# Patient Record
Sex: MALE | Race: WHITE | NOT HISPANIC OR LATINO | Employment: FULL TIME | URBAN - METROPOLITAN AREA
[De-identification: names, ages, dates, MRNs, and addresses within clinical notes are randomized per-mention and may not be internally consistent; named-entity substitution may affect disease eponyms.]

---

## 2024-07-03 ENCOUNTER — OFFICE VISIT (OUTPATIENT)
Dept: FAMILY MEDICINE | Facility: CLINIC | Age: 49
End: 2024-07-03
Payer: COMMERCIAL

## 2024-07-03 VITALS
TEMPERATURE: 97.2 F | RESPIRATION RATE: 12 BRPM | OXYGEN SATURATION: 98 % | WEIGHT: 280.6 LBS | HEIGHT: 72 IN | HEART RATE: 60 BPM | DIASTOLIC BLOOD PRESSURE: 82 MMHG | BODY MASS INDEX: 38.01 KG/M2 | SYSTOLIC BLOOD PRESSURE: 124 MMHG

## 2024-07-03 DIAGNOSIS — I10 BENIGN ESSENTIAL HYPERTENSION: ICD-10-CM

## 2024-07-03 DIAGNOSIS — E66.812 CLASS 2 SEVERE OBESITY DUE TO EXCESS CALORIES WITH SERIOUS COMORBIDITY AND BODY MASS INDEX (BMI) OF 37.0 TO 37.9 IN ADULT (H): ICD-10-CM

## 2024-07-03 DIAGNOSIS — I21.4 NSTEMI (NON-ST ELEVATED MYOCARDIAL INFARCTION) (H): ICD-10-CM

## 2024-07-03 DIAGNOSIS — E78.2 MIXED HYPERLIPIDEMIA: ICD-10-CM

## 2024-07-03 DIAGNOSIS — Z12.11 SCREENING FOR MALIGNANT NEOPLASM OF COLON: ICD-10-CM

## 2024-07-03 DIAGNOSIS — E66.01 CLASS 2 SEVERE OBESITY DUE TO EXCESS CALORIES WITH SERIOUS COMORBIDITY AND BODY MASS INDEX (BMI) OF 37.0 TO 37.9 IN ADULT (H): ICD-10-CM

## 2024-07-03 DIAGNOSIS — I25.10 CORONARY ARTERY DISEASE INVOLVING NATIVE CORONARY ARTERY OF NATIVE HEART WITHOUT ANGINA PECTORIS: Primary | ICD-10-CM

## 2024-07-03 DIAGNOSIS — R73.03 PREDIABETES: ICD-10-CM

## 2024-07-03 PROBLEM — I48.0 PAROXYSMAL ATRIAL FIBRILLATION (H): Status: ACTIVE | Noted: 2021-10-18

## 2024-07-03 PROBLEM — Z95.1 S/P CABG X 3: Status: ACTIVE | Noted: 2021-09-27

## 2024-07-03 PROBLEM — I25.5 CARDIOMYOPATHY, ISCHEMIC: Status: ACTIVE | Noted: 2024-07-03

## 2024-07-03 PROCEDURE — 90471 IMMUNIZATION ADMIN: CPT | Performed by: FAMILY MEDICINE

## 2024-07-03 PROCEDURE — 90677 PCV20 VACCINE IM: CPT | Performed by: FAMILY MEDICINE

## 2024-07-03 PROCEDURE — 99204 OFFICE O/P NEW MOD 45 MIN: CPT | Mod: 25 | Performed by: FAMILY MEDICINE

## 2024-07-03 RX ORDER — ASPIRIN 81 MG/1
81 TABLET, CHEWABLE ORAL DAILY
COMMUNITY
Start: 2021-10-15

## 2024-07-03 RX ORDER — METOPROLOL SUCCINATE 50 MG/1
1 TABLET, EXTENDED RELEASE ORAL DAILY
COMMUNITY
Start: 2023-10-06

## 2024-07-03 RX ORDER — ROSUVASTATIN CALCIUM 40 MG/1
40 TABLET, COATED ORAL AT BEDTIME
COMMUNITY
Start: 2021-10-15 | End: 2024-08-05

## 2024-07-03 RX ORDER — UBIDECARENONE 100 MG
200 CAPSULE ORAL DAILY
COMMUNITY
Start: 2021-10-15

## 2024-07-03 RX ORDER — NITROGLYCERIN 0.4 MG/1
0.4 TABLET SUBLINGUAL EVERY 5 MIN PRN
COMMUNITY
Start: 2023-04-28

## 2024-07-03 RX ORDER — METOPROLOL SUCCINATE 25 MG/1
25 TABLET, EXTENDED RELEASE ORAL DAILY
COMMUNITY
Start: 2021-10-15 | End: 2024-07-03

## 2024-07-03 ASSESSMENT — PAIN SCALES - GENERAL: PAINLEVEL: NO PAIN (0)

## 2024-07-03 NOTE — PROGRESS NOTES
"  Assessment & Plan     Coronary artery disease involving native coronary artery of native heart without angina pectoris  Asymptomatic.  Reinforced heart healthy lifestyle.   Reviewed meds and updated list.  Reestablish care with cardiology.  - Basic metabolic panel  - Lipid panel reflex to direct LDL Fasting  - Adult Cardiology Eval  Referral    NSTEMI (non-ST elevated myocardial infarction) (H)  Asymptomatic. S/p CABG x 3  - Adult Cardiology Eval  Referral    Benign essential hypertension  Controlled.  Low salt, low fat diet.   Exercise as tolerated.  Take meds as prescribed; call if with side effects.    - Basic metabolic panel  - Adult Cardiology Eval  Referral    Mixed hyperlipidemia  Reinforced heart healthy lifestyle.   Continue statin.  - Lipid panel reflex to direct LDL Fasting  - Adult Cardiology Eval  Referral    Prediabetes  Patient said he likely is changing to Farxiga from Jardiance due to insurance. He was given this due to CHF due to ischemic cardiomyopathy.  - Basic metabolic panel  - Hemoglobin A1c    Class 2 severe obesity due to excess calories with serious comorbidity and body mass index (BMI) of 37.0 to 37.9 in adult (H)  Increases complexity of management of the above chronic conditions.  Patient was advised healthy diet recommendations.  Patient was advised weekly exercise recommendations and goals.     Screening for malignant neoplasm of colon  - Colonoscopy Screening  Referral            BMI  Estimated body mass index is 37.79 kg/m  as calculated from the following:    Height as of this encounter: 1.835 m (6' 0.25\").    Weight as of this encounter: 127.3 kg (280 lb 9.6 oz).   Weight management plan: Discussed healthy diet and exercise guidelines      There are no Patient Instructions on file for this visit.    Jessica Hogan is a 49 year old, presenting for the following health issues:  Establish Care (Previous clinic [facility], no specific " "questions or concerns today. )        7/3/2024     7:47 AM   Additional Questions   Roomed by Nallely WESTBROOK   Accompanied by wife     History of Present Illness       Reason for visit:  New primary doctor and general wellness check    He eats 4 or more servings of fruits and vegetables daily.He consumes 1 sweetened beverage(s) daily.He exercises with enough effort to increase his heart rate 30 to 60 minutes per day.  He exercises with enough effort to increase his heart rate 5 days per week.   He is taking medications regularly.         Chief Complaint   Patient presents with    [facility]     Previous clinic [facility], no specific questions or concerns today.        Hyperlipidemia Follow-Up    Are you regularly taking any medication or supplement to lower your cholesterol?   Yes- statin  Are you having muscle aches or other side effects that you think could be caused by your cholesterol lowering medication?  No    Hypertension Follow-up    Do you check your blood pressure regularly outside of the clinic? No   Are you following a low salt diet? Yes  Are your blood pressures ever more than 140 on the top number (systolic) OR more   than 90 on the bottom number (diastolic), for example 140/90? N/a    Vascular Disease Follow-up    How often do you take nitroglycerin? Never  Do you take an aspirin every day? Yes      Review of Systems  Constitutional, HEENT, cardiovascular, pulmonary, GI, , musculoskeletal, neuro, skin, endocrine and psych systems are negative, except as otherwise noted.      Objective    /82 (BP Location: Right arm, Patient Position: Chair, Cuff Size: Adult Large)   Pulse 60   Temp 97.2  F (36.2  C) (Tympanic)   Resp 12   Ht 1.835 m (6' 0.25\")   Wt 127.3 kg (280 lb 9.6 oz)   SpO2 98%   BMI 37.79 kg/m    Body mass index is 37.79 kg/m .  Physical Exam   GENERAL: obese, alert and no distress, ambulatory w/o assist  NECK: no tenderness, no adenopathy,  Thyroid not enlarged  RESP: lungs clear to " auscultation - no rales, no rhonchi, no wheezes  CV: normal rate, regular  rhythm, no murmur  MS: no edema  SKIN: no suspicious lesions, no rashes      No results found for any visits on 07/03/24.        Signed Electronically by: Rajat Yang MD

## 2024-07-22 ENCOUNTER — LAB (OUTPATIENT)
Dept: LAB | Facility: CLINIC | Age: 49
End: 2024-07-22
Payer: COMMERCIAL

## 2024-07-22 DIAGNOSIS — I25.10 CORONARY ARTERY DISEASE INVOLVING NATIVE CORONARY ARTERY OF NATIVE HEART WITHOUT ANGINA PECTORIS: ICD-10-CM

## 2024-07-22 DIAGNOSIS — I10 BENIGN ESSENTIAL HYPERTENSION: ICD-10-CM

## 2024-07-22 DIAGNOSIS — E78.2 MIXED HYPERLIPIDEMIA: ICD-10-CM

## 2024-07-22 DIAGNOSIS — R73.03 PREDIABETES: ICD-10-CM

## 2024-07-22 LAB
ANION GAP SERPL CALCULATED.3IONS-SCNC: 9 MMOL/L (ref 7–15)
BUN SERPL-MCNC: 16.2 MG/DL (ref 6–20)
CALCIUM SERPL-MCNC: 9 MG/DL (ref 8.8–10.4)
CHLORIDE SERPL-SCNC: 102 MMOL/L (ref 98–107)
CHOLEST SERPL-MCNC: 107 MG/DL
CREAT SERPL-MCNC: 0.96 MG/DL (ref 0.67–1.17)
EGFRCR SERPLBLD CKD-EPI 2021: >90 ML/MIN/1.73M2
FASTING STATUS PATIENT QL REPORTED: YES
FASTING STATUS PATIENT QL REPORTED: YES
GLUCOSE SERPL-MCNC: 99 MG/DL (ref 70–99)
HBA1C MFR BLD: 5.8 % (ref 0–5.6)
HCO3 SERPL-SCNC: 26 MMOL/L (ref 22–29)
HDLC SERPL-MCNC: 34 MG/DL
LDLC SERPL CALC-MCNC: 52 MG/DL
NONHDLC SERPL-MCNC: 73 MG/DL
POTASSIUM SERPL-SCNC: 4.1 MMOL/L (ref 3.4–5.3)
SODIUM SERPL-SCNC: 137 MMOL/L (ref 135–145)
TRIGL SERPL-MCNC: 106 MG/DL

## 2024-07-22 PROCEDURE — 36415 COLL VENOUS BLD VENIPUNCTURE: CPT

## 2024-07-22 PROCEDURE — 80048 BASIC METABOLIC PNL TOTAL CA: CPT

## 2024-07-22 PROCEDURE — 83036 HEMOGLOBIN GLYCOSYLATED A1C: CPT

## 2024-07-22 PROCEDURE — 80061 LIPID PANEL: CPT

## 2024-08-05 ENCOUNTER — MYC REFILL (OUTPATIENT)
Dept: FAMILY MEDICINE | Facility: CLINIC | Age: 49
End: 2024-08-05
Payer: COMMERCIAL

## 2024-08-05 DIAGNOSIS — E78.2 MIXED HYPERLIPIDEMIA: Primary | ICD-10-CM

## 2024-08-05 RX ORDER — ROSUVASTATIN CALCIUM 40 MG/1
40 TABLET, COATED ORAL AT BEDTIME
Qty: 90 TABLET | Refills: 1 | Status: SHIPPED | OUTPATIENT
Start: 2024-08-05

## 2024-08-05 NOTE — TELEPHONE ENCOUNTER
Pending Prescriptions:                       Disp   Refills    rosuvastatin (CRESTOR) 40 MG tablet                           Sig: Take 1 tablet (40 mg) by mouth at bedtime    Routing refill request to provider for review/approval because:  Medication is reported/historical        Cricket Wick RN

## 2024-08-11 ENCOUNTER — HEALTH MAINTENANCE LETTER (OUTPATIENT)
Age: 49
End: 2024-08-11

## 2024-09-23 ENCOUNTER — TELEPHONE (OUTPATIENT)
Dept: GASTROENTEROLOGY | Facility: CLINIC | Age: 49
End: 2024-09-23
Payer: COMMERCIAL

## 2024-09-23 NOTE — TELEPHONE ENCOUNTER
"Endoscopy Scheduling Screen      What insurance is in the chart?  Other:  [facility]    Ordering/Referring Provider: Rajat Yang MD    (If ordering provider performs procedure, schedule with ordering provider unless otherwise instructed. )    BMI: There is no height or weight on file to calculate BMI.     Sedation Ordered  general anesthesia.   BMI<= 45 45 < BMI <= 48 48 < BMI < = 50  BMI > 50   No Restrictions No [facility]  No [facility]  [facility] with exceptions Hospital Only OR Only       Do you have a history of malignant hyperthermia?  No    (Females) Are you currently pregnant?   No     Have you been diagnosed or told you have pulmonary hypertension?   No    Do you have any heart conditions?  Yes     In the past year, have you had any hospitalizations for heart related issues including cardiomyopathy, heart attack, or stent placement?  No    Do you have any implantable devices in your body (pacemaker, ICD)?  No    Do you take nitroglycerine?  No    Do you have an LVAD?  No    Have you been told you have moderate to severe sleep apnea?  No.    Have you been told you have COPD, asthma, or any other lung disease?  No    Have you ever had or are you waiting for an organ transplant?  No. Continue scheduling, no site restrictions.    Have you had a stroke or transient ischemic attack (TIA aka \"mini stroke\" in the last 6 months?   No    Have you been diagnosed with or been told you have cirrhosis of the liver?   No.    Are you currently on dialysis?   No    Do you need assistance transferring?   No    BMI: There is no height or weight on file to calculate BMI.     Is patients BMI > 50?  No    BMI > 40?  No    Do you have a diagnosis of diabetes?  No    Do you take an injectable medication for weight loss or diabetes (excluding insulin)?  No    Do you take the medication Naltrexone?  No    Do you take blood thinners?  No     Prep   Are you currently on dialysis or do you have chronic kidney " disease?  No    Do you have a diagnosis of cystic fibrosis (CF)?  No    On a regular basis do you go 3 -5 days between bowel movements?  No    Preferred Pharmacy:  [facility]    Final Scheduling Details     Procedure scheduled  Colonoscopy    Surgeon:  Miley      Date of procedure:  10/19/24     Location  [place] - Per order.    What is your communication preference for Instructions and/or Bowel Prep?   [employer]    Patient Reminders:    You will receive a call from a Nurse to review instructions and health history.  This assessment must be completed prior to your procedure.  Failure to complete the Nurse assessment may result in the procedure being cancelled.       On the day of your procedure, please designate an adult(s) who can drive you home stay with you for the next 24 hours. The medicines used in the exam will make you sleepy. You will not be able to drive.       You cannot take public transportation, ride share services, or non-medical taxi service without a responsible caregiver.  Medical transport services are allowed with the requirement that a responsible caregiver will receive you at your destination.  We require that drivers and caregivers are confirmed prior to your procedure.

## 2024-10-15 ENCOUNTER — MYC REFILL (OUTPATIENT)
Dept: FAMILY MEDICINE | Facility: CLINIC | Age: 49
End: 2024-10-15
Payer: COMMERCIAL

## 2024-10-15 DIAGNOSIS — I25.10 CORONARY ARTERY DISEASE INVOLVING NATIVE CORONARY ARTERY OF NATIVE HEART WITHOUT ANGINA PECTORIS: ICD-10-CM

## 2024-10-15 DIAGNOSIS — I10 BENIGN ESSENTIAL HYPERTENSION: Primary | ICD-10-CM

## 2024-10-17 RX ORDER — METOPROLOL SUCCINATE 50 MG/1
50 TABLET, EXTENDED RELEASE ORAL DAILY
Qty: 90 TABLET | Refills: 3 | Status: SHIPPED | OUTPATIENT
Start: 2024-10-17 | End: 2024-11-03

## 2024-11-03 ENCOUNTER — MYC REFILL (OUTPATIENT)
Dept: FAMILY MEDICINE | Facility: CLINIC | Age: 49
End: 2024-11-03
Payer: COMMERCIAL

## 2024-11-03 DIAGNOSIS — I25.10 CORONARY ARTERY DISEASE INVOLVING NATIVE CORONARY ARTERY OF NATIVE HEART WITHOUT ANGINA PECTORIS: ICD-10-CM

## 2024-11-03 DIAGNOSIS — I10 BENIGN ESSENTIAL HYPERTENSION: ICD-10-CM

## 2024-11-04 RX ORDER — METOPROLOL SUCCINATE 50 MG/1
50 TABLET, EXTENDED RELEASE ORAL DAILY
Qty: 90 TABLET | Refills: 3 | Status: SHIPPED | OUTPATIENT
Start: 2024-11-04

## 2024-11-04 NOTE — TELEPHONE ENCOUNTER
Routing refill request to provider for review/approval because:  Medication is reported/historical    Requested Prescriptions   Pending Prescriptions Disp Refills    sacubitril-valsartan (ENTRESTO) 24-26 MG per tablet       Sig: Take 1 tablet by mouth 2 times daily.       Angiotensin-II Receptors Passed - 11/4/2024 12:29 PM        Passed - Most recent blood pressure under 140/90 in past 12 months     BP Readings from Last 3 Encounters:   07/03/24 124/82       No data recorded            Passed - Medication is active on med list        Passed - Has GFR on file in past 12 months and most recent value is normal        Passed - Medication indicated for associated diagnosis     The medication is prescribed for one or more of the following conditions:    Chronic Kidney Disease (CDK)    Heart Failure (HF)    Diabetes, Nephropathy   Hypertension    Coronary Artery Disease (CAD)   Raynaud's Disease   Ischemic cardiomyopathy   Cardiomyopathy   Pulmonary Hypertension          Passed - Recent (12 mo) or future (90days) visit within the authorizing provider's specialty     The patient must have completed an in-person or virtual visit within the past 12 months or has a future visit scheduled within the next 90 days with the authorizing provider s specialty.  Urgent care and e-visits do not quality as an office visit for this protocol.          Passed - Patient is age 18 or older        Passed - Normal serum potassium on file in past 12 months     Recent Labs   Lab Test 07/22/24  0704   POTASSIUM 4.1                   Angiostensin Receptor Neprilysin Inhibitors (ARNI) Protocol Passed - 11/4/2024 12:29 PM        Passed - Most recent blood pressure under 140/90 in past 12 months     BP Readings from Last 3 Encounters:   07/03/24 124/82       No data recorded            Passed - Recent (12 mo) or future (30 days) visit within the authorizing provider's specialty        Passed - Medication is active on med list        Passed - Has GFR  on file in past 12 months and most recent value is normal        Passed - Medication indicated for associated diagnosis        Passed - Patient is on an ARB        Passed - Patient is on an Ace inhibitor          metoprolol succinate ER (TOPROL XL) 50 MG 24 hr tablet 90 tablet 3     Sig: Take 1 tablet (50 mg) by mouth daily.       Beta-Blockers Protocol Passed - 11/4/2024 12:29 PM        Passed - Most recent blood pressure under 140/90 in past 12 months     BP Readings from Last 3 Encounters:   07/03/24 124/82       No data recorded            Passed - Patient is age 6 or older        Passed - Medication is active on med list        Passed - Medication indicated for associated diagnosis     Medication is associated with one or more of the following diagnoses:     Hypertension (HTN)   Atrial fibrillation/flutter   Angina   ASCVD   Migraine   Heart Failure   Tremor   Anxiety   Ocular hypertension   Glaucoma   PTSD   Obstructive hypertrophic cardiomyopathy   History of myocarditis   Palpitations   POTS (postural orthostatic tachycardia syndrome)   SVT (supraventricular tachycardia)   Hyperthyroidism   Tachycardia   Acute non-st segment elevation myocardial infarction   Subsequent non-st segment elevation myocardial infarction   Ischemic myocardial dysfunction          Passed - Recent (12 mo) or future (90 days) visit within the authorizing provider's specialty     The patient must have completed an in-person or virtual visit within the past 12 months or has a future visit scheduled within the next 90 days with the authorizing provider s specialty.  Urgent care and e-visits do not quality as an office visit for this protocol.

## 2024-11-18 ENCOUNTER — ANESTHESIA EVENT (OUTPATIENT)
Dept: GASTROENTEROLOGY | Facility: CLINIC | Age: 49
End: 2024-11-18
Payer: COMMERCIAL

## 2024-11-18 ASSESSMENT — ENCOUNTER SYMPTOMS: DYSRHYTHMIAS: 1

## 2024-11-18 NOTE — ANESTHESIA PREPROCEDURE EVALUATION
Anesthesia Pre-Procedure Evaluation    Patient: Edison Krishnamurthy   MRN: [id number] : 1975        Procedure : Procedure(s):  Colonoscopy          Past Medical History:   Diagnosis Date     Congestive heart failure (H) 10/15/2021     Heart disease 10/15/2021     Hypertension 10/15/2021      Past Surgical History:   Procedure Laterality Date     CARDIAC SURGERY  10/15/2021    Triple Bypass Heart Surgery      No Known Allergies   Social History     Tobacco Use     Smoking status: Never     Smokeless tobacco: Never   Substance Use Topics     Alcohol use: Yes     Comment: 2-3 month      Wt Readings from Last 1 Encounters:   24 127.3 kg (280 lb 9.6 oz)        Anesthesia Evaluation   Pt has had prior anesthetic. Type: General.        ROS/MED HX  ENT/Pulmonary:  - neg pulmonary ROS     Neurologic:  - neg neurologic ROS     Cardiovascular:     (+) Dyslipidemia hypertension- -  CAD -  CABG-date: . -      CHF etiology: ischemic cardiomyopathy                 dysrhythmias, a-fib,        Previous cardiac testing   Echo: Date: Results:    Stress Test:  Date: Results:    ECG Reviewed:  Date: 2024 Results:    Cath:  Date: Results:      METS/Exercise Tolerance:     Hematologic:  - neg hematologic  ROS     Musculoskeletal:  - neg musculoskeletal ROS     GI/Hepatic:     (+)        bowel prep,            Renal/Genitourinary:  - neg Renal ROS     Endo:     (+)               Obesity,       Psychiatric/Substance Use:  - neg psychiatric ROS     Infectious Disease:  - neg infectious disease ROS     Malignancy:       Other:            Physical Exam    Airway  airway exam normal      Mallampati: II   TM distance: > 3 FB   Neck ROM: full   Mouth opening: > 3 cm    Respiratory Devices and Support         Dental       (+) Minor Abnormalities - some fillings, tiny chips      Cardiovascular   cardiovascular exam normal          Pulmonary   pulmonary exam normal        breath sounds clear to auscultation       OUTSIDE  "LABS:  CBC: No results found for: \"WBC\", \"HGB\", \"HCT\", \"PLT\"  BMP:   Lab Results   Component Value Date     07/22/2024    POTASSIUM 4.1 07/22/2024    CHLORIDE 102 07/22/2024    CO2 26 07/22/2024    BUN 16.2 07/22/2024    CR 0.96 07/22/2024    GLC 99 07/22/2024     COAGS: No results found for: \"PTT\", \"INR\", \"FIBR\"  POC: No results found for: \"BGM\", \"HCG\", \"HCGS\"  HEPATIC: No results found for: \"ALBUMIN\", \"PROTTOTAL\", \"ALT\", \"AST\", \"GGT\", \"ALKPHOS\", \"BILITOTAL\", \"BILIDIRECT\", \"MIGUELITO\"  OTHER:   Lab Results   Component Value Date    A1C 5.8 (H) 07/22/2024    ARLETTE 9.0 07/22/2024       Anesthesia Plan    ASA Status:  3    NPO Status:  NPO Appropriate    Anesthesia Type: General.     - Airway: Native airway   Induction: Intravenous.   Maintenance: TIVA.        Consents    Anesthesia Plan(s) and associated risks, benefits, and realistic alternatives discussed. Questions answered and patient/representative(s) expressed understanding.     - Discussed:     - Discussed with:  Patient            Postoperative Care    Pain management: IV analgesics, Multi-modal analgesia.   PONV prophylaxis: Ondansetron (or other 5HT-3)     Comments:             CAN Belle CRNA    I have reviewed the pertinent notes and labs in the chart from the past 30 days and (re)examined the patient.  Any updates or changes from those notes are reflected in this note.               # Hypertension: Noted on problem list                     "

## 2024-11-19 ENCOUNTER — HOSPITAL ENCOUNTER (OUTPATIENT)
Facility: CLINIC | Age: 49
Discharge: HOME | End: 2024-11-19
Attending: STUDENT IN AN ORGANIZED HEALTH CARE EDUCATION/TRAINING PROGRAM | Admitting: STUDENT IN AN ORGANIZED HEALTH CARE EDUCATION/TRAINING PROGRAM
Payer: COMMERCIAL

## 2024-11-19 ENCOUNTER — ANESTHESIA (OUTPATIENT)
Dept: GASTROENTEROLOGY | Facility: CLINIC | Age: 49
End: 2024-11-19
Payer: COMMERCIAL

## 2024-11-19 VITALS
BODY MASS INDEX: 36.57 KG/M2 | HEIGHT: 72 IN | WEIGHT: 270 LBS | TEMPERATURE: 98.5 F | DIASTOLIC BLOOD PRESSURE: 85 MMHG | HEART RATE: 67 BPM | OXYGEN SATURATION: 96 % | SYSTOLIC BLOOD PRESSURE: 112 MMHG | RESPIRATION RATE: 16 BRPM

## 2024-11-19 LAB — COLONOSCOPY: NORMAL

## 2024-11-19 PROCEDURE — 370N000017 HC ANESTHESIA TECHNICAL FEE, PER MIN: Performed by: STUDENT IN AN ORGANIZED HEALTH CARE EDUCATION/TRAINING PROGRAM

## 2024-11-19 PROCEDURE — 45385 COLONOSCOPY W/LESION REMOVAL: CPT | Mod: PT | Performed by: STUDENT IN AN ORGANIZED HEALTH CARE EDUCATION/TRAINING PROGRAM

## 2024-11-19 PROCEDURE — 88305 TISSUE EXAM BY PATHOLOGIST: CPT | Mod: TC | Performed by: STUDENT IN AN ORGANIZED HEALTH CARE EDUCATION/TRAINING PROGRAM

## 2024-11-19 PROCEDURE — 250N000009 HC RX 250: Performed by: NURSE ANESTHETIST, CERTIFIED REGISTERED

## 2024-11-19 PROCEDURE — 250N000011 HC RX IP 250 OP 636: Performed by: NURSE ANESTHETIST, CERTIFIED REGISTERED

## 2024-11-19 RX ORDER — LIDOCAINE 40 MG/G
CREAM TOPICAL
Status: DISCONTINUED | OUTPATIENT
Start: 2024-11-19 | End: 2024-11-19 | Stop reason: HOSPADM

## 2024-11-19 RX ORDER — FLUMAZENIL 0.1 MG/ML
0.2 INJECTION, SOLUTION INTRAVENOUS
Status: DISCONTINUED | OUTPATIENT
Start: 2024-11-19 | End: 2024-11-19 | Stop reason: HOSPADM

## 2024-11-19 RX ORDER — NALOXONE HYDROCHLORIDE 0.4 MG/ML
0.4 INJECTION, SOLUTION INTRAMUSCULAR; INTRAVENOUS; SUBCUTANEOUS
Status: DISCONTINUED | OUTPATIENT
Start: 2024-11-19 | End: 2024-11-19 | Stop reason: HOSPADM

## 2024-11-19 RX ORDER — NALOXONE HYDROCHLORIDE 0.4 MG/ML
0.2 INJECTION, SOLUTION INTRAMUSCULAR; INTRAVENOUS; SUBCUTANEOUS
Status: DISCONTINUED | OUTPATIENT
Start: 2024-11-19 | End: 2024-11-19 | Stop reason: HOSPADM

## 2024-11-19 RX ORDER — PROPOFOL 10 MG/ML
INJECTION, EMULSION INTRAVENOUS PRN
Status: DISCONTINUED | OUTPATIENT
Start: 2024-11-19 | End: 2024-11-19

## 2024-11-19 RX ORDER — LIDOCAINE HYDROCHLORIDE 20 MG/ML
INJECTION, SOLUTION INFILTRATION; PERINEURAL PRN
Status: DISCONTINUED | OUTPATIENT
Start: 2024-11-19 | End: 2024-11-19

## 2024-11-19 RX ADMIN — LIDOCAINE HYDROCHLORIDE 100 MG: 20 INJECTION, SOLUTION INFILTRATION; PERINEURAL at 09:36

## 2024-11-19 RX ADMIN — PROPOFOL 50 MG: 10 INJECTION, EMULSION INTRAVENOUS at 09:36

## 2024-11-19 ASSESSMENT — ACTIVITIES OF DAILY LIVING (ADL)
ADLS_ACUITY_SCORE: 0
ADLS_ACUITY_SCORE: 0

## 2024-11-19 NOTE — LETTER
Edison Krishnamurthy  [address] [facility] [address]      November 20, 2024    Dear Edison,  This letter is written to inform you of the results of your recent colonoscopy.  Your examination showed polyp(s) in your ascending colon. All polyps were removed in their entirety and sent for review by a pathologist. As you will see on the pathology report below, the tissue(s) were tubular adenomatous polyps. Your examination was otherwise without abnormality.    Adenomatous polyps are entirely benign (non-cancerous); however, patients who have developed these polyps are at an increased risk for developing additional polyps in the future. If these are not eventually removed, there is a risk of developing colon cancer. We will advise more frequent examinations with you because of the risk associated with this type of polyp.    Given these findings, I recommend that you undergo a repeat colonoscopy in 7 year(s) for surveillance. We will enter you into a recall system so you receive a reminder closer to the time that you are due for repeat examination. Your physician also recommends that you adhere to a high fiber diet indefinitely to promote colon health.     Please remember that this recommendation is made with the understanding that you are not experiencing persistent changes in bowel function, bleeding per rectum, and/or significant abdominal pain. If you experience these symptoms, please contact your primary care provider for a further evaluation.     If you have any questions or concerns about the results of your colonoscopy or the appropriate follow-up, please contact my assistant at [phone]    Sincerely,      Janusz Trent MD   [facility]  ___                    Resulted Orders   Surgical Pathology Exam   Result Value Ref Range    Case Report       Surgical Pathology Report                         Case: [id number]                                  Authorizing Provider:  Janusz Trent,  "MD       Collected:           11/19/2024 09:47 AM          Ordering Location:     [facility]   Received:            11/19/2024 10:39 AM                                 [place]                                                                      Pathologist:           Terry Alvarez MD                                                            Specimen:    Large Intestine, Colon, Ascending, Ascending colon polyp                                   Final Diagnosis       Colon, ascending, polypectomy:  --Tubular adenoma.        Clinical Information       Procedure:  COLONOSCOPY, FLEXIBLE, WITH LESION REMOVAL USING SNARE  Pre-op Diagnosis: Special screening for malignant neoplasms, colon [Z12.11]  Post-op Diagnosis: Z12.11 - Special screening for malignant neoplasms, colon [ICD-10-CM]      Gross Description       A(1). Large Intestine, Colon, Ascending, Ascending colon polyp:  The specimen is received in formalin, labeled with the patient's name, medical record number and other identifying information designated \"ascending colon polyp\". It consists of multiple, less than 0.1-0.3 cm pale-tan soft tissue fragments which are filtered and submitted entirely in 1 cassette.   (SUSAN Ray (ASCP) 11/19/2024 2:18 PM       Microscopic Description       Microscopic examination was performed.        Performing Labs       The technical component of this testing was completed at [facility].    Stain controls for all stains resulted within this report have been reviewed and show appropriate reactivity.       Case Images       "

## 2024-11-19 NOTE — ANESTHESIA CARE TRANSFER NOTE
Patient: Edison Krishnamurthy    Procedure: Procedure(s):  COLONOSCOPY, FLEXIBLE, WITH LESION REMOVAL USING SNARE       Diagnosis: Special screening for malignant neoplasms, colon [Z12.11]  Diagnosis Additional Information: No value filed.    Anesthesia Type:   General     Note:    Oropharynx: spontaneously breathing  Level of Consciousness: drowsy  Oxygen Supplementation: room air    Independent Airway: airway patency satisfactory and stable  Dentition: dentition unchanged  Vital Signs Stable: post-procedure vital signs reviewed and stable  Report to RN Given: handoff report given  Patient transferred to: Phase II    Handoff Report: Identifed the Patient, Identified the Reponsible Provider, Reviewed the pertinent medical history, Discussed the surgical course, Reviewed Intra-OP anesthesia mangement and issues during anesthesia, Set expectations for post-procedure period and Allowed opportunity for questions and acknowledgement of understanding  Vitals:  Vitals Value Taken Time   BP     Temp     Pulse     Resp     SpO2         Electronically Signed By: CAN Jackson CRNA  November 19, 2024  9:58 AM

## 2024-11-19 NOTE — H&P
[facility]    Pre-Endoscopy History and Physical     Edison Krishnamurthy MRN# [id number]   YOB: 1975 Age: 49 year old     Date of Procedure: 11/19/2024  Primary care provider: Rajat Yang  Type of Endoscopy: Colonoscopy with possible biopsy, possible polypectomy  Reason for Procedure: screening  Type of Anesthesia Anticipated: Conscious Sedation    HPI:    Edison is a 49 year old male who will be undergoing the above procedure.      A history and physical has been performed. The patient's medications and allergies have been reviewed. The risks and benefits of the procedure and the sedation options and risks were discussed with the patient.  All questions were answered and informed consent was obtained.      He denies a personal or family history of anesthesia complications or bleeding disorders.     Colonoscopy, first one. Screening. ASA II, no AC. No family hx of colon cancer.     Patient Active Problem List   Diagnosis    Coronary artery disease involving native coronary artery of native heart without angina pectoris    NSTEMI (non-ST elevated myocardial infarction) (H)    Benign essential hypertension    Mixed hyperlipidemia    Class 2 severe obesity due to excess calories with serious comorbidity in adult (H)    Cardiomyopathy, ischemic    Paroxysmal atrial fibrillation (H)    S/P CABG x 3    Prediabetes        Past Medical History:   Diagnosis Date    Congestive heart failure (H) 10/15/2021    Heart disease 10/15/2021    Hypertension 10/15/2021        Past Surgical History:   Procedure Laterality Date    CARDIAC SURGERY  10/15/2021    Triple Bypass Heart Surgery       Social History     Tobacco Use    Smoking status: Never    Smokeless tobacco: Never   Substance Use Topics    Alcohol use: Yes     Comment: 2-3 month       Family History   Problem Relation Age of Onset    Depression Mother     Anxiety Disorder Mother     Coronary Artery Disease Father      "Hypertension Father     Hyperlipidemia Father     Coronary Artery Disease Maternal Grandmother        Prior to Admission medications    Medication Sig Start Date End Date Taking? Authorizing Provider   aspirin (ASA) 81 MG chewable tablet Take 81 mg by mouth daily 10/15/21   Reported, Patient   co-enzyme Q-10 100 MG CAPS capsule Take 200 mg by mouth daily 10/15/21   Reported, Patient   empagliflozin (JARDIANCE) 10 MG TABS tablet Take 10 mg by mouth daily 7/1/23   Reported, Patient   metoprolol succinate ER (TOPROL XL) 50 MG 24 hr tablet Take 1 tablet (50 mg) by mouth daily. 11/4/24   Rajat Yang MD   nitroGLYcerin (NITROSTAT) 0.4 MG sublingual tablet Place 0.4 mg under the tongue every 5 minutes as needed for chest pain 4/28/23   Reported, Patient   rosuvastatin (CRESTOR) 40 MG tablet Take 1 tablet (40 mg) by mouth at bedtime 8/5/24   Joanie Rogers APRN CNP   sacubitril-valsartan (ENTRESTO) 24-26 MG per tablet Take 1 tablet by mouth 2 times daily. 11/4/24   Rajat Yang MD       No Known Allergies     REVIEW OF SYSTEMS:   5 point ROS negative except as noted above in HPI, including Gen., Resp., CV, GI &  system review.    PHYSICAL EXAM:   There were no vitals taken for this visit. Estimated body mass index is 37.79 kg/m  as calculated from the following:    Height as of 7/3/24: 1.835 m (6' 0.25\").    Weight as of 7/3/24: 127.3 kg (280 lb 9.6 oz).   Constitutional: Awake, alert, no acute distress.  Eyes: No scleral icterus.  Conjunctiva are without injection.  ENMT: Mucous membranes moist, dentition and gums are intact.   Neck: Soft, supple, trachea midline.    Endocrine: n/a   Lymphatic: There is no cervical, submandibularadenopathy.  Respiratory: normal efforts on room air  Cardiovascular: extremities warm and well perfused  Abdomen: Non-distended, non-tender,  No masses,  Musculoskeletal: Full range of motion in the upper and lower extremities.    Skin: No skin rashes or " lesions to inspection.  No petechia.    Neurologic: alerted and oriented 3x  Psychiatric: The patient's affect is not blunted and mood is appropriate.  DIAGNOSTICS:    Not indicated    IMPRESSION   ASA Class 2 - Mild systemic disease    PLAN:   Plan for Colonoscopy with possible biopsy, possible polypectomy. We discussed the risks, benefits and alternatives and the patient wished to proceed.  Patient is cleared for the above procedure.    The above has been forwarded to the consulting provider.    Janusz Trent MD on 11/19/2024 at 8:27 AM  [facility]

## 2024-11-19 NOTE — ANESTHESIA POSTPROCEDURE EVALUATION
Patient: Edison Krishnamurthy    Procedure: Procedure(s):  COLONOSCOPY, FLEXIBLE, WITH LESION REMOVAL USING SNARE       Anesthesia Type:  General    Note:  Disposition: Outpatient   Postop Pain Control:             Sign Out: Well controlled pain   PONV:    Neuro/Psych:             Sign Out: Acceptable/Baseline neuro status   Airway/Respiratory:             Sign Out: Acceptable/Baseline resp. status   CV/Hemodynamics:             Sign Out: Acceptable CV status   Other NRE: NONE   DID A NON-ROUTINE EVENT OCCUR? No       Last vitals:  Vitals:    11/19/24 0823   BP: (!) 134/93   Pulse: 75   Temp: 36.6  C (97.8  F)   SpO2: 97%       Electronically Signed By: CAN Jackson CRNA  November 19, 2024  9:58 AM

## 2024-11-20 LAB
PATH REPORT.COMMENTS IMP SPEC: NORMAL
PATH REPORT.COMMENTS IMP SPEC: NORMAL
PATH REPORT.FINAL DX SPEC: NORMAL
PATH REPORT.GROSS SPEC: NORMAL
PATH REPORT.MICROSCOPIC SPEC OTHER STN: NORMAL
PATH REPORT.RELEVANT HX SPEC: NORMAL
PHOTO IMAGE: NORMAL

## 2024-11-20 PROCEDURE — 88305 TISSUE EXAM BY PATHOLOGIST: CPT | Mod: 26 | Performed by: PATHOLOGY

## 2024-11-26 ENCOUNTER — VIRTUAL VISIT (OUTPATIENT)
Dept: CARDIOLOGY | Facility: CLINIC | Age: 49
End: 2024-11-26
Attending: FAMILY MEDICINE
Payer: COMMERCIAL

## 2024-11-26 VITALS
DIASTOLIC BLOOD PRESSURE: 77 MMHG | SYSTOLIC BLOOD PRESSURE: 110 MMHG | WEIGHT: 280 LBS | BODY MASS INDEX: 37.71 KG/M2 | HEART RATE: 74 BPM | OXYGEN SATURATION: 95 % | RESPIRATION RATE: 12 BRPM

## 2024-11-26 DIAGNOSIS — I10 BENIGN ESSENTIAL HYPERTENSION: ICD-10-CM

## 2024-11-26 DIAGNOSIS — I25.10 CORONARY ARTERY DISEASE INVOLVING NATIVE CORONARY ARTERY OF NATIVE HEART WITHOUT ANGINA PECTORIS: ICD-10-CM

## 2024-11-26 DIAGNOSIS — I21.4 NSTEMI (NON-ST ELEVATED MYOCARDIAL INFARCTION) (H): ICD-10-CM

## 2024-11-26 DIAGNOSIS — E78.2 MIXED HYPERLIPIDEMIA: ICD-10-CM

## 2024-11-26 RX ORDER — DAPAGLIFLOZIN 10 MG/1
1 TABLET, FILM COATED ORAL
COMMUNITY
Start: 2024-05-16

## 2024-11-26 NOTE — PROGRESS NOTES
Edison is a 49 year old who is being evaluated via a billable video visit.    How would you like to obtain your AVS? AppTankhart  If the video visit is dropped, the invitation should be resent by: Text to cell phone: [phone] -- IN CLINIC VIRTUAL   Will anyone else be joining your video visit? No      Video-Visit Details    Type of service:  Video Visit   Originating Location (pt. Location): Other [facility]    Distant Location (provider location):  Off-site  Platform used for Video Visit: [facility]     CARDIOLOGY [facility] CONSULTATION    PRIMARY CARE PHYSICIAN:  Rajat Yang    HISTORY OF PRESENT ILLNESS:  This is an extremely delightful 49-year-old gentleman who is transferring care from [facility] due to insurance which was and see me at [facility].  This is a virtual visit.  He is in the clinic with his wife however I could not be there in person due to personal issues.    This patient has a history of obesity with a BMI of 37, hypertension, family history of coronary disease, and prediabetes.  He presented in 2021 with progressive symptoms concerning for angina and was noted to have an EF of 30 to 35% and severe multivessel coronary artery disease.  This was in the [facility] system.  He underwent bypass surgery with LIMA to LAD, HUI with vein extension to the obtuse marginal, and a vein graft to the PDA in September 2021.  He had some postoperative atrial fibrillation without any recurrences.  He also had PE and DVT and was on short-term anticoagulation but has been off that since 2022.  His EF was down to 30 to 35% but with revascularization and excellent heart failure therapy with neurohormonal blockade, his EF is normalized.  His last echo was last year showing normal EF without valve disease.    This patient is transferring care.  He has absolutely no cardiac symptoms.  NYHA class I.  No angina heart failure symptoms syncope presyncope.  He is tolerating his aspirin  well.  No side effects to neurohormonal blockade reported.  He is tolerating high intensity statin well.  His LDL is at goal at 52.    PAST MEDICAL HISTORY:  Past Medical History:   Diagnosis Date    Congestive heart failure (H) 10/15/2021    Heart disease 10/15/2021    Hypertension 10/15/2021       MEDICATIONS:  Current Outpatient Medications   Medication Sig Dispense Refill    aspirin (ASA) 81 MG chewable tablet Take 81 mg by mouth daily      co-enzyme Q-10 100 MG CAPS capsule Take 200 mg by mouth daily      empagliflozin (JARDIANCE) 10 MG TABS tablet Take 10 mg by mouth daily      metoprolol succinate ER (TOPROL XL) 50 MG 24 hr tablet Take 1 tablet (50 mg) by mouth daily. 90 tablet 3    nitroGLYcerin (NITROSTAT) 0.4 MG sublingual tablet Place 0.4 mg under the tongue every 5 minutes as needed for chest pain      rosuvastatin (CRESTOR) 40 MG tablet Take 1 tablet (40 mg) by mouth at bedtime 90 tablet 1    sacubitril-valsartan (ENTRESTO) 24-26 MG per tablet Take 1 tablet by mouth 2 times daily. 180 tablet 3    FARXIGA 10 MG TABS tablet Take 1 tablet by mouth daily at 2 pm. (Patient not taking: Reported on 11/26/2024)       No current facility-administered medications for this visit.       SOCIAL HISTORY:  I have reviewed this patient's social history and updated it with pertinent information if needed. Edison DAVINA Krishnamurthy  reports that he has never smoked. He has never used smokeless tobacco. He reports current alcohol use. He reports that he does not use drugs.    PHYSICAL EXAM:  Pulse:  [74] 74  Resp:  [12] 12  BP: (110)/(77) 110/77  SpO2:  [95 %] 95 %  280 lbs 0 oz    This was a virtual visit and as such limited physical exam possible    Constitutional: alert, no distress  Respiratory: Non labored  Cardiovascular: No significant edema reported  Neuropsychiatric: appropriate affact    ASSESSMENT and RECOMMENDATIONS:  Coronary artery disease status post bypass surgery  Heart failure with recovered ejection  fraction  Obesity hypertension prediabetes    This patient is doing very well from a cardiac standpoint with revascularization and guideline directed neurohormonal blockade.  He has no diuretic requirements and he is NYHA class I.  He is on 3 drug regimen with Entresto Jardiance and beta-blockers.  He is at excellent care through [facility] and indeed his EF is normalized.    Pathophysiology of ACS explained to the patient.  Recommend continuation of aspirin statin therapy long-term.  Continue heart failure therapy.  No interruption.    If any change in clinical symptoms, he needs to see cardiovascular tension immediately otherwise I will have him follow-up with us routinely in 1 year from now.    Healthy diet weight loss and strength training strongly recommended.  This patient is prediabetic and has a BMI of 37.  He needs to watch these risk factors including his insulin resistance and weight and obesity otherwise his risk for future major adverse cardiovascular events will continue to increase and remain high.    Of note, although his echocardiogram in Care Everywhere in 2023 reported normal LV function without hemodynamically significant valve disease, it was reported as a technically very difficult study and most cardiac structures were not well-visualized.  If this patient has any further clinical need for cardiac imaging down the road, I would recommend getting a cardiac MRI.    It was a pleasure seeing this patient in clinic today. Please do not hesitate to contact me with any future questions.     CARMELITA Sylvester, [facility]  Cardiology - [facility]  November 26, 2024    Level of visit complexity: Moderate    This note was completed in part using dictation via the Dragon voice recognition software. Some word and grammatical errors may occur and must be interpreted in the appropriate clinical context.  If there are any questions pertaining to this issue, please contact me for further clarification.

## 2024-11-26 NOTE — LETTER
11/26/2024    Rajat Yang MD  [address]              [place] [address]    RE: Edison GHOSH Efrainkenyatta       Dear Colleague,     I had the pleasure of seeing Edison Krishnamurthy in the [facility].  Edison is a 49 year old who is being evaluated via a billable video visit.    How would you like to obtain your AVS? MyChart  If the video visit is dropped, the invitation should be resent by: Text to cell phone: [phone] -- IN CLINIC VIRTUAL   Will anyone else be joining your video visit? No      Video-Visit Details    Type of service:  Video Visit   Originating Location (pt. Location): Other [facility]    Distant Location (provider location):  Off-site  Platform used for Video Visit: [facility]     CARDIOLOGY CLINIC CONSULTATION    PRIMARY CARE PHYSICIAN:  Rajat Yang    HISTORY OF PRESENT ILLNESS:  This is an extremely delightful 49-year-old gentleman who is transferring care from [facility] due to insurance which was and see me at [facility] heart [facility].  This is a virtual visit.  He is in the clinic with his wife however I could not be there in person due to personal issues.    This patient has a history of obesity with a BMI of 37, hypertension, family history of coronary disease, and prediabetes.  He presented in 2021 with progressive symptoms concerning for angina and was noted to have an EF of 30 to 35% and severe multivessel coronary artery disease.  This was in the [facility] system.  He underwent bypass surgery with LIMA to LAD, HUI with vein extension to the obtuse marginal, and a vein graft to the PDA in September 2021.  He had some postoperative atrial fibrillation without any recurrences.  He also had PE and DVT and was on short-term anticoagulation but has been off that since 2022.  His EF was down to 30 to 35% but with revascularization and excellent heart failure therapy with neurohormonal blockade, his EF is normalized.  His last echo was  last year showing normal EF without valve disease.    This patient is transferring care.  He has absolutely no cardiac symptoms.  NYHA class I.  No angina heart failure symptoms syncope presyncope.  He is tolerating his aspirin well.  No side effects to neurohormonal blockade reported.  He is tolerating high intensity statin well.  His LDL is at goal at 52.    PAST MEDICAL HISTORY:  Past Medical History:   Diagnosis Date     Congestive heart failure (H) 10/15/2021     Heart disease 10/15/2021     Hypertension 10/15/2021       MEDICATIONS:  Current Outpatient Medications   Medication Sig Dispense Refill     aspirin (ASA) 81 MG chewable tablet Take 81 mg by mouth daily       co-enzyme Q-10 100 MG CAPS capsule Take 200 mg by mouth daily       empagliflozin (JARDIANCE) 10 MG TABS tablet Take 10 mg by mouth daily       metoprolol succinate ER (TOPROL XL) 50 MG 24 hr tablet Take 1 tablet (50 mg) by mouth daily. 90 tablet 3     nitroGLYcerin (NITROSTAT) 0.4 MG sublingual tablet Place 0.4 mg under the tongue every 5 minutes as needed for chest pain       rosuvastatin (CRESTOR) 40 MG tablet Take 1 tablet (40 mg) by mouth at bedtime 90 tablet 1     sacubitril-valsartan (ENTRESTO) 24-26 MG per tablet Take 1 tablet by mouth 2 times daily. 180 tablet 3     FARXIGA 10 MG TABS tablet Take 1 tablet by mouth daily at 2 pm. (Patient not taking: Reported on 11/26/2024)       No current facility-administered medications for this visit.       SOCIAL HISTORY:  I have reviewed this patient's social history and updated it with pertinent information if needed. Edison Krishnamurthy  reports that he has never smoked. He has never used smokeless tobacco. He reports current alcohol use. He reports that he does not use drugs.    PHYSICAL EXAM:  Pulse:  [74] 74  Resp:  [12] 12  BP: (110)/(77) 110/77  SpO2:  [95 %] 95 %  280 lbs 0 oz    This was a virtual visit and as such limited physical exam possible    Constitutional: alert, no  distress  Respiratory: Non labored  Cardiovascular: No significant edema reported  Neuropsychiatric: appropriate affact    ASSESSMENT and RECOMMENDATIONS:  Coronary artery disease status post bypass surgery  Heart failure with recovered ejection fraction  Obesity hypertension prediabetes    This patient is doing very well from a cardiac standpoint with revascularization and guideline directed neurohormonal blockade.  He has no diuretic requirements and he is NYHA class I.  He is on 3 drug regimen with Entresto Jardiance and beta-blockers.  He is at excellent care through [facility] and indeed his EF is normalized.    Pathophysiology of ACS explained to the patient.  Recommend continuation of aspirin statin therapy long-term.  Continue heart failure therapy.  No interruption.    If any change in clinical symptoms, he needs to see cardiovascular tension immediately otherwise I will have him follow-up with us routinely in 1 year from now.    Healthy diet weight loss and strength training strongly recommended.  This patient is prediabetic and has a BMI of 37.  He needs to watch these risk factors including his insulin resistance and weight and obesity otherwise his risk for future major adverse cardiovascular events will continue to increase and remain high.    It was a pleasure seeing this patient in clinic today. Please do not hesitate to contact me with any future questions.     CARMELITA Sylvester, [facility]  Cardiology - [facility]  November 26, 2024    Level of visit complexity: Moderate    This note was completed in part using dictation via the Dragon voice recognition software. Some word and grammatical errors may occur and must be interpreted in the appropriate clinical context.  If there are any questions pertaining to this issue, please contact me for further clarification.      Thank you for allowing me to participate in the care of your patient.      Sincerely,     Kaye Gaspar MD     [facility]  [facility]  cc:   Rajat Yang MD  [address] [place] [address]       no

## 2024-12-04 ENCOUNTER — PATIENT OUTREACH (OUTPATIENT)
Dept: GASTROENTEROLOGY | Facility: CLINIC | Age: 49
End: 2024-12-04
Payer: COMMERCIAL

## 2025-01-27 DIAGNOSIS — E78.2 MIXED HYPERLIPIDEMIA: ICD-10-CM

## 2025-01-28 RX ORDER — ROSUVASTATIN CALCIUM 40 MG/1
40 TABLET, COATED ORAL AT BEDTIME
Qty: 90 TABLET | Refills: 1 | Status: SHIPPED | OUTPATIENT
Start: 2025-01-28

## 2025-04-10 ENCOUNTER — OFFICE VISIT (OUTPATIENT)
Dept: PODIATRY | Facility: CLINIC | Age: 50
End: 2025-04-10
Payer: COMMERCIAL

## 2025-04-10 VITALS — BODY MASS INDEX: 37.11 KG/M2 | HEIGHT: 73 IN | WEIGHT: 280 LBS

## 2025-04-10 DIAGNOSIS — M72.2 PLANTAR FASCIITIS, RIGHT: Primary | ICD-10-CM

## 2025-04-10 PROCEDURE — 99203 OFFICE O/P NEW LOW 30 MIN: CPT | Performed by: PODIATRIST

## 2025-04-10 PROCEDURE — 1125F AMNT PAIN NOTED PAIN PRSNT: CPT | Performed by: PODIATRIST

## 2025-04-10 ASSESSMENT — PAIN SCALES - GENERAL: PAINLEVEL_OUTOF10: MILD PAIN (2)

## 2025-04-10 NOTE — NURSING NOTE
"Chief Complaint   Patient presents with    Consult     Left foot pain       Initial Ht 1.854 m (6' 1\")   Wt 127 kg (280 lb)   BMI 36.94 kg/m   Estimated body mass index is 36.94 kg/m  as calculated from the following:    Height as of this encounter: 1.854 m (6' 1\").    Weight as of this encounter: 127 kg (280 lb).  Medications and allergies reviewed.      Leah BUSTOS MA    "

## 2025-04-10 NOTE — PROGRESS NOTES
PATIENT HISTORY:  Edison Krishnamurthy is a 50 year old male who presents to clinic in consultation at the request of  Referred Self  with a chief complaint of left foot pain.  The patient is seen by themselves.  The patient relates the pain is primarily located around the bottom of the arch into the left heel.  Patient has a past injury as a teenager. He stepped on a needle. There is a spot in the heel where the needle had inserted. that has been going on for 3 week(s).  The patient has previously tried limit his walking and OTC insertswith little relief.  Denies any prior history of similar issues..  The patient is currently employed as  .  Any previous notes and studies that pertain to the patient's condition were reviewed.    Pertinent medical, surgical and family history was reviewed in the [facility] chart.    Past Medical History:   Past Medical History:   Diagnosis Date    Congestive heart failure (H) 10/15/2021    Heart disease 10/15/2021    Hypertension 10/15/2021       Medications:   Current Outpatient Medications:     aspirin (ASA) 81 MG chewable tablet, Take 81 mg by mouth daily, Disp: , Rfl:     co-enzyme Q-10 100 MG CAPS capsule, Take 200 mg by mouth daily, Disp: , Rfl:     empagliflozin (JARDIANCE) 10 MG TABS tablet, Take 10 mg by mouth daily, Disp: , Rfl:     FARXIGA 10 MG TABS tablet, Take 1 tablet by mouth daily at 2 pm. (Patient not taking: Reported on 11/26/2024), Disp: , Rfl:     metoprolol succinate ER (TOPROL XL) 50 MG 24 hr tablet, Take 1 tablet (50 mg) by mouth daily., Disp: 90 tablet, Rfl: 3    nitroGLYcerin (NITROSTAT) 0.4 MG sublingual tablet, Place 0.4 mg under the tongue every 5 minutes as needed for chest pain, Disp: , Rfl:     rosuvastatin (CRESTOR) 40 MG tablet, TAKE 1 TABLET BY MOUTH AT BEDTIME, Disp: 90 tablet, Rfl: 1    sacubitril-valsartan (ENTRESTO) 24-26 MG per tablet, Take 1 tablet by mouth 2 times daily., Disp: 180 tablet, Rfl: 3     Allergies:  No Known  Allergies      LOWER EXTREMITY PHYSICAL EXAM    Dermatologic: Skin is intact to {RIGHT /LEFT:105017} lower extremity without significant lesions, rash or abrasion.        Vascular: DP & PT pulses are intact & regular on the {RIGHT /LEFT:276404}.   CFT and skin temperature is normal to the {RIGHT /LEFT:951469} lower extremity.     Neurologic: Lower extremity sensation is intact to light touch.  No evidence of weakness in the {RIGHT /LEFT:565099} lower extremity.        Musculoskeletal: Patient is ambulatory without assistive device or brace.  No gross ankle deformity noted.  No foot or ankle joint effusion is noted.  Noted pain on palpation on the plantar aspect of the {RIGHT /LEFT:210403} heel near the insertion point of the plantar fascia.  No surrounding erythema or edema noted.  Noted tight gastroc complex on the {RIGHT /LEFT:800565}.    Diagnostics:  Radiographs included three views of the {RIGHT LEFT BOTH NO:832210} foot demonstrating *** no cortical erosions or periosteal elevation.  All joint margins appear stable.  There is no apparent fracture or tumor formation noted.  There is no evidence of foreign body.  The images were independently reviewed by myself along with the patient explaining the findings.      ASSESSMENT / PLAN:   No diagnosis found.    I have explained to Edison about the conditions.  We discussed the underlying contributing factors to the condition as well as treatment options along with expected length of recovery.  At this time, the patient was educated on the importance of offloading supportive shoes and other devices.  I demonstrated to the patient calf stretches to perform every hour daily until symptoms resolve.  After symptoms resolve, the patient was advised to perform the stretches 3 times daily to prevent future recurrence.  The patient was instructed to perform warm soaks with Epson salt after which to also apply over-the-counter Voltaren gel to deeply massage the injured tissue.   The patient was instructed to do this on a daily basis until symptoms resolve.  The patient was fitted with a Dynaflex insert that will aid in offloading the tension forces to the soft tissues and prevent further inflammation.  To reduce the amount of current inflammation, the patient was prescribed Mobic 7.5 mg to be taken daily with food and instructed to stop taking if any stomach irritation or swelling in extremities are noted.  The patient may return in four weeks for reevaluation to determine if any further treatment will be needed.    Edison verbalized agreement with and understanding of the rational for the diagnosis and treatment plan.  All questions were answered to best of my ability and the patient's satisfaction. The patient was advised to contact the clinic with any questions that may arise after the clinic visit.      Disclaimer: This note consists of symbols derived from keyboarding, dictation and/or voice recognition software. As a result, there may be errors in the script that have gone undetected. Please consider this when interpreting information found in this chart.       TERRY Farmer D.P.M., F.LOLA.C.F.A.S.

## 2025-04-10 NOTE — LETTER
4/10/2025      Edison Krishnamurthy  [address]  [place] [address]      Dear Colleague,    Thank you for referring your patient, Edison Krishnamurthy, to the [facility]. Please see a copy of my visit note below.    PATIENT HISTORY:  Edison Krishnamurthy is a 50 year old male who presents to clinic   with a chief complaint of left foot pain.  The patient is seen by themselves.  The patient relates the pain is primarily located around the bottom of the arch into the left heel.  Patient has a past injury as a teenager. He stepped on a needle. There is a spot in the heel where the needle had inserted. that has been going on for 3 week(s).  The patient has previously tried limit his walking and OTC insertswith little relief.  Denies any prior history of similar issues..  The patient is currently employed as  .  Any previous notes and studies that pertain to the patient's condition were reviewed.    Pertinent medical, surgical and family history was reviewed in the [facility] chart.    Past Medical History:   Past Medical History:   Diagnosis Date     Congestive heart failure (H) 10/15/2021     Heart disease 10/15/2021     Hypertension 10/15/2021       Medications:   Current Outpatient Medications:      aspirin (ASA) 81 MG chewable tablet, Take 81 mg by mouth daily, Disp: , Rfl:      co-enzyme Q-10 100 MG CAPS capsule, Take 200 mg by mouth daily, Disp: , Rfl:      empagliflozin (JARDIANCE) 10 MG TABS tablet, Take 10 mg by mouth daily, Disp: , Rfl:      FARXIGA 10 MG TABS tablet, Take 1 tablet by mouth daily at 2 pm. (Patient not taking: Reported on 11/26/2024), Disp: , Rfl:      metoprolol succinate ER (TOPROL XL) 50 MG 24 hr tablet, Take 1 tablet (50 mg) by mouth daily., Disp: 90 tablet, Rfl: 3     nitroGLYcerin (NITROSTAT) 0.4 MG sublingual tablet, Place 0.4 mg under the tongue every 5 minutes as needed for chest pain, Disp: , Rfl:      rosuvastatin (CRESTOR) 40 MG tablet, TAKE  1 TABLET BY MOUTH AT BEDTIME, Disp: 90 tablet, Rfl: 1     sacubitril-valsartan (ENTRESTO) 24-26 MG per tablet, Take 1 tablet by mouth 2 times daily., Disp: 180 tablet, Rfl: 3     Allergies:  No Known Allergies      LOWER EXTREMITY PHYSICAL EXAM    Dermatologic: Skin is intact to right lower extremity without significant lesions, rash or abrasion.        Vascular: DP & PT pulses are intact & regular on the right.   CFT and skin temperature is normal to the right lower extremity.     Neurologic: Lower extremity sensation is intact to light touch.  No evidence of weakness in the right lower extremity.        Musculoskeletal: Patient is ambulatory without assistive device or brace.  No gross ankle deformity noted.  No foot or ankle joint effusion is noted.  Noted pain on palpation on the plantar aspect of the right heel near the insertion point of the plantar fascia.  No surrounding erythema or edema noted.  Noted tight gastroc complex on the right.           ASSESSMENT / PLAN:     ICD-10-CM    1. Plantar fasciitis, right  M72.2 Ankle/Foot Bracing Supplies Order Foot Insert; Right          I have explained to Edison about the conditions.  We discussed the underlying contributing factors to the condition as well as treatment options along with expected length of recovery.  At this time, the patient was educated on the importance of offloading supportive shoes and other devices.  I demonstrated to the patient calf stretches to perform every hour daily until symptoms resolve.  After symptoms resolve, the patient was advised to perform the stretches 3 times daily to prevent future recurrence.  The patient was instructed to perform warm soaks with Epson salt after which to also apply over-the-counter Voltaren gel to deeply massage the injured tissue.  The patient was instructed to do this on a daily basis until symptoms resolve.  The patient was fitted with a Dynaflex insert that will aid in offloading the tension forces to  the soft tissues and prevent further inflammation.   The patient may return in four weeks for reevaluation to determine if any further treatment will be needed.    Edison verbalized agreement with and understanding of the rational for the diagnosis and treatment plan.  All questions were answered to best of my ability and the patient's satisfaction. The patient was advised to contact the clinic with any questions that may arise after the clinic visit.      Disclaimer: This note consists of symbols derived from keyboarding, dictation and/or voice recognition software. As a result, there may be errors in the script that have gone undetected. Please consider this when interpreting information found in this chart.       TERRY Farmer D.P.M., F.LOLA.C.F.A.S.      Again, thank you for allowing me to participate in the care of your patient.        Sincerely,        Thanh Farmer DPM    Electronically signed

## 2025-04-10 NOTE — PATIENT INSTRUCTIONS

## 2025-07-07 DIAGNOSIS — E78.2 MIXED HYPERLIPIDEMIA: ICD-10-CM

## 2025-07-08 RX ORDER — ROSUVASTATIN CALCIUM 40 MG/1
40 TABLET, COATED ORAL AT BEDTIME
Qty: 90 TABLET | Refills: 0 | Status: SHIPPED | OUTPATIENT
Start: 2025-07-08

## 2025-08-16 ENCOUNTER — HEALTH MAINTENANCE LETTER (OUTPATIENT)
Age: 50
End: 2025-08-16

## (undated) DEVICE — ENDO SNARE EXACTO COLD 9MM LOOP 2.4MMX230CM 00711115